# Patient Record
Sex: FEMALE | Race: WHITE | NOT HISPANIC OR LATINO | Employment: UNEMPLOYED | URBAN - METROPOLITAN AREA
[De-identification: names, ages, dates, MRNs, and addresses within clinical notes are randomized per-mention and may not be internally consistent; named-entity substitution may affect disease eponyms.]

---

## 2017-06-26 ENCOUNTER — OFFICE VISIT (OUTPATIENT)
Dept: FAMILY MEDICINE | Facility: CLINIC | Age: 37
End: 2017-06-26
Payer: COMMERCIAL

## 2017-06-26 ENCOUNTER — TELEPHONE (OUTPATIENT)
Dept: FAMILY MEDICINE | Facility: CLINIC | Age: 37
End: 2017-06-26

## 2017-06-26 VITALS
HEIGHT: 64 IN | BODY MASS INDEX: 23.9 KG/M2 | DIASTOLIC BLOOD PRESSURE: 70 MMHG | HEART RATE: 75 BPM | WEIGHT: 140 LBS | OXYGEN SATURATION: 100 % | SYSTOLIC BLOOD PRESSURE: 122 MMHG

## 2017-06-26 DIAGNOSIS — F41.9 ANXIETY: ICD-10-CM

## 2017-06-26 DIAGNOSIS — G47.419 PRIMARY NARCOLEPSY WITHOUT CATAPLEXY: Primary | ICD-10-CM

## 2017-06-26 PROBLEM — Z23 NEED FOR IMMUNIZATION AGAINST INFLUENZA: Status: ACTIVE | Noted: 2017-06-26

## 2017-06-26 PROBLEM — Z87.42 HISTORY OF FEMALE GENITAL SYSTEM DISORDER: Status: ACTIVE | Noted: 2017-06-26

## 2017-06-26 PROBLEM — Z86.2 HISTORY OF ANEMIA: Status: ACTIVE | Noted: 2017-06-26

## 2017-06-26 PROBLEM — Z78.9 NON-SMOKER: Status: ACTIVE | Noted: 2017-06-26

## 2017-06-26 PROBLEM — F52.0 HYPOACTIVE SEXUAL DESIRE: Status: ACTIVE | Noted: 2017-06-26

## 2017-06-26 PROBLEM — Z87.442 HISTORY OF RENAL CALCULI: Status: ACTIVE | Noted: 2017-06-26

## 2017-06-26 PROBLEM — R09.81 NASAL CONGESTION: Status: ACTIVE | Noted: 2017-06-26

## 2017-06-26 PROBLEM — S01.91XA LACERATION OF HEAD: Status: ACTIVE | Noted: 2017-06-26

## 2017-06-26 PROBLEM — R53.83 FATIGUE: Status: ACTIVE | Noted: 2017-06-26

## 2017-06-26 PROBLEM — R42 DIZZINESS OF UNKNOWN CAUSE: Status: ACTIVE | Noted: 2017-06-26

## 2017-06-26 LAB
ALBUMIN SERPL-MCNC: 4.8 G/DL (ref 3.1–4.7)
ALP SERPL-CCNC: 65 IU/L (ref 40–104)
ALT (SGPT): 35 IU/L (ref 3–33)
AST SERPL-CCNC: 29 IU/L (ref 10–40)
BASOPHILS NFR BLD: 0 K/UL (ref 0–0.2)
BASOPHILS NFR BLD: 0.5 %
BILIRUB SERPL-MCNC: 0.8 MG/DL (ref 0.3–1)
BUN SERPL-MCNC: 16 MG/DL (ref 8–20)
CALCIUM SERPL-MCNC: 9.2 MG/DL (ref 7.7–10.4)
CHLORIDE: 103 MMOL/L (ref 98–110)
CO2 SERPL-SCNC: 24.7 MMOL/L (ref 22.8–31.6)
CREATININE: 0.89 MG/DL (ref 0.6–1.4)
EOSINOPHIL NFR BLD: 0.1 K/UL (ref 0–0.7)
EOSINOPHIL NFR BLD: 1.4 %
ERYTHROCYTE [DISTWIDTH] IN BLOOD BY AUTOMATED COUNT: 12.6 % (ref 11.7–14.9)
GLUCOSE: 91 MG/DL (ref 70–99)
GRAN #: 5.2 K/UL (ref 1.4–6.5)
GRAN%: 67.6 %
HCT VFR BLD AUTO: 38.8 % (ref 36–48)
HGB BLD-MCNC: 12.8 G/DL (ref 12–15)
IMMATURE GRANS (ABS): 0 K/UL (ref 0–1)
IMMATURE GRANULOCYTES: 0.4 %
LYMPH #: 1.8 K/UL (ref 1.2–3.4)
LYMPH%: 23.6 %
MCH RBC QN AUTO: 29.6 PG (ref 25–35)
MCHC RBC AUTO-ENTMCNC: 33 G/DL (ref 31–36)
MCV RBC AUTO: 89.8 FL (ref 79–98)
MONO #: 0.5 K/UL (ref 0.1–0.6)
MONO%: 6.5 %
NUCLEATED RBCS: 0 %
PLATELET # BLD AUTO: 277 K/UL (ref 140–440)
PMV BLD AUTO: 10.5 FL (ref 8.8–12.7)
POTASSIUM SERPL-SCNC: 4.1 MMOL/L (ref 3.5–5)
PROT SERPL-MCNC: 8 G/DL (ref 6–8.2)
RBC # BLD AUTO: 4.32 M/UL (ref 3.5–5.5)
SODIUM: 137 MMOL/L (ref 134–144)
TSH SERPL DL<=0.005 MIU/L-ACNC: 2.36 ULU/ML (ref 0.3–5.6)
WBC # BLD AUTO: 7.7 K/UL (ref 5–10)

## 2017-06-26 PROCEDURE — 99214 OFFICE O/P EST MOD 30 MIN: CPT | Mod: ,,, | Performed by: FAMILY MEDICINE

## 2017-06-26 RX ORDER — ACETAMINOPHEN 500 MG
1 TABLET ORAL
COMMUNITY

## 2017-06-26 RX ORDER — VENLAFAXINE HYDROCHLORIDE 75 MG/1
75 CAPSULE, EXTENDED RELEASE ORAL DAILY
Qty: 30 CAPSULE | Refills: 5 | Status: SHIPPED | OUTPATIENT
Start: 2017-06-26 | End: 2018-03-20

## 2017-06-26 RX ORDER — ALPRAZOLAM 0.25 MG/1
0.25 TABLET ORAL
COMMUNITY
End: 2018-03-20 | Stop reason: SDUPTHER

## 2017-06-26 RX ORDER — VENLAFAXINE HYDROCHLORIDE 75 MG/1
1 CAPSULE, EXTENDED RELEASE ORAL DAILY
COMMUNITY
Start: 2017-03-23 | End: 2017-06-26 | Stop reason: SDUPTHER

## 2017-06-26 NOTE — PROGRESS NOTES
Subjective:       Patient ID:  Marianne Ramirez is a 37 y.o. female with multiple medical diagnoses as listed in the medical history and problem list that presents for Other (narcolepsy, eyes glazed over while eating dinner,  called Dr. Serrano, woke up the next AM & felt very groggy, vitals were normal) and Anxiety   Reports of similar episode since 6 years ago. More severe this Saturday, denies of urinary incontinence and bit tongue.       Anxiety   Presents for follow-up visit. Symptoms include dizziness, excessive worry and nervous/anxious behavior. Patient reports no chest pain, confusion, nausea, palpitations, shortness of breath or suicidal ideas. Symptoms occur most days. The severity of symptoms is moderate. The quality of sleep is fair. Nighttime awakenings: one to two.     Compliance with medications is %.   Dizziness:   Chronicity:  Recurrent  Onset:  In the past 7 days  Progression since onset:  Waxing and waning  Frequency:  Constantly  Pain Scale:  0/10  Severity:  Moderate  Duration:  2-5 hours  Dizziness characteristics:  Off-balance, trouble focusing eyes, spacial disorientation and lightheaded/impending faint   Associated symptoms: light-headedness and syncope.no hearing loss, no ear pain, no fever, no headaches, no nausea, no vomiting, no diaphoresis, no weakness, no palpitations, no facial weakness, no slurred speech and no chest pain.  Aggravated by:  Nothing  Treatments tried:  Nothing   PMH includes: anxiety.    Review of Systems   Constitutional: Negative for appetite change, chills, diaphoresis, fatigue and fever.   HENT: Negative for congestion, ear pain, hearing loss, sore throat and trouble swallowing.    Eyes: Negative for photophobia, pain and visual disturbance.   Respiratory: Negative for cough, chest tightness and shortness of breath.    Cardiovascular: Positive for syncope. Negative for chest pain, palpitations and leg swelling.   Gastrointestinal: Negative for  abdominal pain, blood in stool, constipation, diarrhea, nausea and vomiting.   Endocrine: Negative for cold intolerance and heat intolerance.   Genitourinary: Negative for difficulty urinating, flank pain, pelvic pain and vaginal pain.   Musculoskeletal: Negative for arthralgias and myalgias.   Skin: Negative for rash.   Allergic/Immunologic: Negative for immunocompromised state.   Neurological: Positive for dizziness and light-headedness. Negative for weakness and headaches.   Hematological: Negative for adenopathy. Does not bruise/bleed easily.   Psychiatric/Behavioral: Negative for confusion, self-injury and suicidal ideas. The patient is nervous/anxious.        Past Medical History:   Diagnosis Date    Anemia 2007    during pregnancy    Anxiety     Kidney stones     Ovarian cyst, right      Past Surgical History:   Procedure Laterality Date    OVARIAN CYST REMOVAL  2006    RIGHT OOPHORECTOMY  02/03/2012    [facility]     Family History   Problem Relation Age of Onset    Asthma Mother     Cancer Father      Non-hodgkins lymphoma    Cancer Maternal Grandmother      Breast CA    Heart disease Maternal Grandfather     Cancer Paternal Grandmother      Liver CA    Stroke Paternal Grandfather      Social History     Social History    Marital status:      Spouse name: N/A    Number of children: N/A    Years of education: N/A     Occupational History    Color guard director      [employer]     Social History Main Topics    Smoking status: Former Smoker     Types: Cigarettes     Quit date: 2/17/2004    Smokeless tobacco: Never Used      Comment: X 8 years ago    Alcohol use Yes      Comment: occasional    Drug use: No    Sexual activity: Not Asked     Other Topics Concern    None     Social History Narrative    None     Current Outpatient Prescriptions   Medication Sig Dispense Refill    acetaminophen (TYLENOL EXTRA STRENGTH) 500 MG tablet Take 1 tablet by mouth as needed.      alprazolam  (XANAX) 0.25 MG tablet Take 0.25 mg by mouth as needed for Anxiety.      venlafaxine (EFFEXOR-XR) 75 MG 24 hr capsule Take 1 capsule (75 mg total) by mouth Daily. 30 capsule 5     No current facility-administered medications for this visit.      Review of patient's allergies indicates:  No Known Allergies  Objective:      Vitals:    06/26/17 1108   BP: 122/70   Pulse: 75     Physical Exam   Constitutional: She is oriented to person, place, and time. She appears well-developed and well-nourished.   HENT:   Head: Normocephalic.   Eyes: Pupils are equal, round, and reactive to light. Right eye exhibits no discharge. Left eye exhibits no discharge. No scleral icterus.   Neck: No thyromegaly present.   Cardiovascular: Normal rate and regular rhythm.  Exam reveals no gallop and no friction rub.    No murmur heard.  Pulmonary/Chest: No respiratory distress. She has no wheezes. She has no rales. She exhibits no tenderness.   Abdominal: She exhibits no distension. There is no tenderness.   Musculoskeletal: She exhibits no tenderness.   Neurological: She is alert and oriented to person, place, and time. She displays normal reflexes. No cranial nerve deficit. She exhibits normal muscle tone. Coordination normal.   Skin: No erythema.   Psychiatric: She has a normal mood and affect.       Assessment:       1. Primary narcolepsy without cataplexy    2. Anxiety        Plan:       Primary narcolepsy without cataplexy  Comments:  since 2011, mild, worsening recently. PSG diagnostic study and MSLT/ MWT , refer to neurology to r/o seizure.  advise avoid benzo.  Orders:  -     Ambulatory referral to Neurology  -     CBC auto differential; Future; Expected date: 06/26/2017  -     Comprehensive metabolic panel; Future; Expected date: 06/26/2017  -     TSH; Future; Expected date: 06/26/2017    Anxiety  Comments:  on effexor and benzo prn.   Orders:  -     venlafaxine (EFFEXOR-XR) 75 MG 24 hr capsule; Take 1 capsule (75 mg total) by mouth  Daily.  Dispense: 30 capsule; Refill: 5      Advise patient RTC or ER if SOB, chest pain, vision changes, N/V or worsening of symptoms . Advise pt no- driving until assessment from Neurology. Pt voices understanding.     Return in about 2 weeks (around 7/10/2017) for anxiety.      6/26/2017 Lucero Felix M.D.

## 2017-06-26 NOTE — TELEPHONE ENCOUNTER
----- Message from Lucero Felix MD sent at 6/26/2017  3:17 PM CDT -----  Please notify pt : the result is acceptable.There are no significant abnormalities that are identified.  Please dont hesitate contact us if having any questions.  Or It will be reviewed in detail in the next visit.    Thanks . Lucero FELIX MD

## 2017-06-26 NOTE — PROGRESS NOTES
Please notify pt : the result is acceptable.There are no significant abnormalities that are identified.  Please dont hesitate contact us if having any questions.  Or It will be reviewed in detail in the next visit.    Thanks . Lucero CHAMPION MD

## 2017-07-12 ENCOUNTER — TELEPHONE (OUTPATIENT)
Dept: FAMILY MEDICINE | Facility: CLINIC | Age: 37
End: 2017-07-12

## 2017-07-12 NOTE — TELEPHONE ENCOUNTER
----- Message from Lucero Felix MD sent at 7/11/2017  2:25 PM CDT -----  Please let pt know : no CANDIE. thanks  ----- Message -----  From: Crista Moran LPN  Sent: 7/11/2017   8:02 AM  To: Lucero Felix MD

## 2018-03-20 ENCOUNTER — OFFICE VISIT (OUTPATIENT)
Dept: FAMILY MEDICINE | Facility: CLINIC | Age: 38
End: 2018-03-20
Payer: COMMERCIAL

## 2018-03-20 VITALS
DIASTOLIC BLOOD PRESSURE: 70 MMHG | HEART RATE: 74 BPM | BODY MASS INDEX: 25.21 KG/M2 | HEIGHT: 64 IN | RESPIRATION RATE: 16 BRPM | SYSTOLIC BLOOD PRESSURE: 118 MMHG | OXYGEN SATURATION: 97 % | WEIGHT: 147.69 LBS

## 2018-03-20 DIAGNOSIS — F41.1 GAD (GENERALIZED ANXIETY DISORDER): ICD-10-CM

## 2018-03-20 DIAGNOSIS — R51.9 NEW ONSET OF HEADACHES: Primary | ICD-10-CM

## 2018-03-20 DIAGNOSIS — R53.83 FATIGUE, UNSPECIFIED TYPE: ICD-10-CM

## 2018-03-20 DIAGNOSIS — Z00.00 PHYSICAL EXAM: ICD-10-CM

## 2018-03-20 PROCEDURE — 99214 OFFICE O/P EST MOD 30 MIN: CPT | Mod: ,,, | Performed by: INTERNAL MEDICINE

## 2018-03-20 RX ORDER — BUPROPION HYDROCHLORIDE 150 MG/1
150 TABLET ORAL DAILY
Qty: 30 TABLET | Refills: 11 | Status: SHIPPED | OUTPATIENT
Start: 2018-03-20 | End: 2019-03-20

## 2018-03-20 RX ORDER — ALPRAZOLAM 0.25 MG/1
0.25 TABLET ORAL DAILY PRN
Qty: 30 TABLET | Refills: 5 | Status: SHIPPED | OUTPATIENT
Start: 2018-03-20

## 2018-03-20 RX ORDER — MELOXICAM 7.5 MG/1
7.5 TABLET ORAL DAILY
Qty: 30 TABLET | Refills: 2 | Status: SHIPPED | OUTPATIENT
Start: 2018-03-20 | End: 2018-06-21 | Stop reason: SDUPTHER

## 2018-03-20 RX ORDER — VENLAFAXINE HYDROCHLORIDE 150 MG/1
150 CAPSULE, EXTENDED RELEASE ORAL DAILY
Qty: 30 CAPSULE | Refills: 11 | Status: SHIPPED | OUTPATIENT
Start: 2018-03-20 | End: 2019-03-20

## 2018-03-20 RX ORDER — METHOCARBAMOL 750 MG/1
750 TABLET, FILM COATED ORAL 2 TIMES DAILY PRN
Qty: 60 TABLET | Refills: 1 | Status: SHIPPED | OUTPATIENT
Start: 2018-03-20 | End: 2018-04-19

## 2018-03-20 RX ORDER — BUTALBITAL, ACETAMINOPHEN AND CAFFEINE 50; 325; 40 MG/1; MG/1; MG/1
1 TABLET ORAL EVERY 8 HOURS PRN
Qty: 60 TABLET | Refills: 4 | Status: SHIPPED | OUTPATIENT
Start: 2018-03-20 | End: 2018-04-19

## 2018-03-21 NOTE — PATIENT INSTRUCTIONS
General Neck and Back Pain    Both neck and back pain are usually caused by injury to the muscles or ligaments of the spine. Sometimes the disks that separate each bone of the spine may cause pain by pressing on a nearby nerve. Back and neck pain may appear after a sudden twisting or bending force (such as in a car accident), or sometimes after a simple awkward movement. In either case, muscle spasm is often present and adds to the pain.  Acute neck and back pain usually gets better in 1 to 2 weeks. Pain related to disk disease, arthritis in the spinal joints or spinal stenosis (narrowing of the spinal canal) can become chronic and last for months or years.  Back and neck pain are common problems. Most people feel better in 1 or 2 weeks, and most of the rest in 1 to 2 months. Most people can remain active.  People experience and describe pain differently.  · Pain can be sharp, stabbing, shooting, aching, cramping, or burning  · Movement, standing, bending, lifting, sitting, or walking may worsen the pain  · Pain can be localized to one spot or area, or it can be more generalized  · Pain can spread or radiate upwards, downwards, to the front, or go down your arms  · Muscle spasm may occur.  Most of the time mechanical problems with the muscles or spine cause the pain. it is usually caused by an injury, whether known or not, to the muscles or ligaments. While illnesses can cause back pain, it is usually not caused by a serious illness. Pain is usually related to physical activity, whether sports, exercise, work, or normal activity. Sometimes it can occur without an identifiable cause. This can happen simply by stretching or moving wrong, without noting pain at the time. Other causes include:  · Overexertion, lifting, pushing, pulling incorrectly or too aggressively.  · Sudden twisting, bending or stretching from an accident (car or fall), or accidental movement.  · Poor posture  · Poor conditioning, lack of regular  exercise  · Spinal disc disease or arthritis  · Stress  · Pregnancy, or illness like appendicitis, bladder or kidney infection, pelvic infections   Home care  · For neck pain: Use a comfortable pillow that supports the head and keeps the spine in a neutral position. The position of the head should not be tilted forward or backward.  · When in bed, try to find a position of comfort. A firm mattress is best. Try lying flat on your back with pillows under your knees. You can also try lying on your side with your knees bent up towards your chest and a pillow between your knees.  · At first, do not try to stretch out the sore spots. If there is a strain, it is not like the good soreness you get after exercising without an injury. In this case, stretching may make it worse.  · Avoid prolonged sitting, long car rides or travel. This puts more stress on the lower back than standing or walking.  · During the first 24 to 72 hours after an injury, apply an ice pack to the painful area for 20 minutes and then remove it for 20 minutes over a period of 60 to 90 minutes or several times a day.   · You can alternate ice and heat therapies. Talk with your healthcare provider about the best treatment for your back or neck pain. As a safety precaution, do not use a heating pad at bedtime. Sleeping with a heating pad can lead to skin burns or tissue damage.  · Therapeutic massage can help relax the back and neck muscles without stretching them.  · Be aware of safe lifting methods and do not lift anything over 15 pounds until all the pain is gone.  Medications  Talk to your healthcare provider before using medicine, especially if you have other medical problems or are taking other medicines.  · You may use over-the-counter medicine to control pain, unless another pain medicine was prescribed. If you have chronic conditions like diabetes, liver or kidney disease, stomach ulcers,  gastrointestinal bleeding, or are taking blood thinner  medicines.  · Be careful if you are given pain medicines, narcotics, or medicine for muscle spasm. They can cause drowsiness, and can affect your coordination, reflexes, and judgment. Do not drive or operate heavy machinery.  Follow-up care  Follow up with your healthcare provider, or as advised. Physical therapy or further tests may be needed.  If X-rays were taken, you will be notified of any new findings that may affect your care.  Call 911  Seek emergency medical care if any of the following occur:  · Trouble breathing  · Confusion  · Very drowsy or trouble awakening  · Fainting or loss of consciousness  · Rapid or very slow heart rate  · Loss of bowel or bladder control  When to seek medical advice  Call your healthcare provider right away if any of these occur:  · Pain becomes worse or spreads into your arms or legs  · Weakness, numbness or pain in one or both arms or legs  · Numbness in the groin area  · Difficulty walking  · Fever of 100.4ºF (38ºC) or higher, or as directed by your healthcare provider  Date Last Reviewed: 7/1/2016 © 2000-2017 [employer]. [address] [place] [address], [place] [address]. All rights reserved. This information is not intended as a substitute for professional medical care. Always follow your healthcare professional's instructions.

## 2018-03-21 NOTE — PROGRESS NOTES
"Subjective:       Patient ID: Marianne Ramirez is a 37 y.o. female.    Chief Complaint: Follow-up (general)    37 yr old female her for her new onset of tension headaches that occur every day and get worse as the day goes on . They have been going on for the last couple of months and getting worse in intensity and frequently. they do not wake her form sleep and she does admit to sleeping fairly well. Her  is accompanied her and he gets both migraines and tension headaches. She newer really suffered with these before. Her periods are still fairly regularly and they are not pulsating in nature. They start out tin the back of her neck and can radiate into her should blades bilaterally with a muscle like tension. 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 She does have a history of AYALA which       Review of Systems   Constitutional: Negative for activity change, appetite change, chills, diaphoresis, fatigue, fever and unexpected weight change.   HENT: Negative for congestion, ear pain, mouth sores, postnasal drip, sinus pressure, sore throat and trouble swallowing.    Eyes: Negative for pain, redness and visual disturbance.   Respiratory: Negative for apnea, cough, chest tightness, shortness of breath and wheezing.    Cardiovascular: Negative for chest pain, palpitations and leg swelling.   Gastrointestinal: Negative for abdominal distention, abdominal pain, blood in stool, constipation, diarrhea, nausea and vomiting.   Endocrine: Negative for cold intolerance, polydipsia, polyphagia and polyuria.   Genitourinary: Negative for difficulty urinating, dysuria, flank pain, frequency, hematuria, menstrual problem, pelvic pain and urgency.   Musculoskeletal: Positive for neck pain. Negative for arthralgias, back pain and joint swelling.   Skin: Negative for color change, rash and wound.   Neurological: Negative for dizziness, tremors, seizures, syncope, weakness, light-headedness, numbness and " headaches.   Hematological: Negative for adenopathy. Does not bruise/bleed easily.   Psychiatric/Behavioral: Positive for decreased concentration and dysphoric mood. Negative for confusion, hallucinations, self-injury, sleep disturbance and suicidal ideas. The patient is nervous/anxious.        Past Medical History:   Diagnosis Date    Anemia 2007    during pregnancy    Anxiety     Kidney stones     Ovarian cyst, right        Past Surgical History:   Procedure Laterality Date    OVARIAN CYST REMOVAL  2006    RIGHT [facility]03/2012    [facility]       Family History   Problem Relation Age of Onset    Asthma Mother     Cancer Father      Non-hodgkins lymphoma    Cancer Maternal Grandmother      Breast CA    Heart disease Maternal Grandfather     Cancer Paternal Grandmother      Liver CA    Stroke Paternal Grandfather        Social History     Social History    Marital status:      Spouse name: N/A    Number of children: N/A    Years of education: N/A     Occupational History    Color guard director      [employer]     Social History Main Topics    Smoking status: Former Smoker     Types: Cigarettes     Quit date: 2/17/2004    Smokeless tobacco: Never Used      Comment: X 8 years ago    Alcohol use Yes      Comment: occasional    Drug use: No    Sexual activity: Not Asked     Other Topics Concern    None     Social History Narrative    None       Current Outpatient Prescriptions   Medication Sig Dispense Refill    acetaminophen (TYLENOL EXTRA STRENGTH) 500 MG tablet Take 1 tablet by mouth as needed.      ALPRAZolam (XANAX) 0.25 MG tablet Take 1 tablet (0.25 mg total) by mouth daily as needed for Anxiety. 30 tablet 5    buPROPion (WELLBUTRIN XL) 150 MG TB24 tablet Take 1 tablet (150 mg total) by mouth once daily. 30 tablet 11    butalbital-acetaminophen-caffeine -40 mg (FIORICET, ESGIC) -40 mg per tablet Take 1 tablet by mouth every 8 (eight) hours as needed for Pain. 60  tablet 4    meloxicam (MOBIC) 7.5 MG tablet Take 1 tablet (7.5 mg total) by mouth once daily. With food 30 tablet 2    methocarbamol (ROBAXIN) 750 MG Tab Take 1 tablet (750 mg total) by mouth 2 (two) times daily as needed. 60 tablet 1    venlafaxine (EFFEXOR-XR) 150 MG Cp24 Take 1 capsule (150 mg total) by mouth once daily. 30 capsule 11     No current facility-administered medications for this visit.        Review of patient's allergies indicates:  No Known Allergies  Objective:      Physical Exam   Constitutional: She is oriented to person, place, and time. She appears well-developed and well-nourished.   HENT:   Head: Normocephalic and atraumatic.   Eyes: Right eye exhibits no discharge. Left eye exhibits no discharge. No scleral icterus.   Neck: Neck supple. No JVD present.   Cardiovascular: Normal rate and regular rhythm.  Exam reveals no gallop and no friction rub.    No murmur heard.  Pulmonary/Chest: Effort normal and breath sounds normal. She has no wheezes. She has no rales.   Abdominal: Soft. Bowel sounds are normal. She exhibits no distension. There is no tenderness.   Musculoskeletal: She exhibits no edema.        Cervical back: She exhibits decreased range of motion, tenderness, swelling, pain and spasm.   Lymphadenopathy:     She has no cervical adenopathy.   Neurological: She is alert and oriented to person, place, and time. She has normal reflexes. No cranial nerve deficit.   Skin: No rash noted. No erythema.   Psychiatric: She has a normal mood and affect.   Nursing note and vitals reviewed.      Assessment:       1. New onset of headaches    2. AYALA (generalized anxiety disorder)    3. Fatigue, unspecified type    4. Physical exam        Plan:       New onset of headaches  -     X-Ray Cervical Spine Complete 5 view  -     meloxicam (MOBIC) 7.5 MG tablet; Take 1 tablet (7.5 mg total) by mouth once daily. With food  Dispense: 30 tablet; Refill: 2  -     methocarbamol (ROBAXIN) 750 MG Tab; Take 1  tablet (750 mg total) by mouth 2 (two) times daily as needed.  Dispense: 60 tablet; Refill: 1  -     butalbital-acetaminophen-caffeine -40 mg (FIORICET, ESGIC) -40 mg per tablet; Take 1 tablet by mouth every 8 (eight) hours as needed for Pain.  Dispense: 60 tablet; Refill: 4    AYALA (generalized anxiety disorder)  -     ALPRAZolam (XANAX) 0.25 MG tablet; Take 1 tablet (0.25 mg total) by mouth daily as needed for Anxiety.  Dispense: 30 tablet; Refill: 5  -     venlafaxine (EFFEXOR-XR) 150 MG Cp24; Take 1 capsule (150 mg total) by mouth once daily.  Dispense: 30 capsule; Refill: 11  -     buPROPion (WELLBUTRIN XL) 150 MG TB24 tablet; Take 1 tablet (150 mg total) by mouth once daily.  Dispense: 30 tablet; Refill: 11    Fatigue, unspecified type  -     CBC auto differential; Future; Expected date: 03/20/2018  -     TSH; Future; Expected date: 03/20/2018    Physical exam  -     Comprehensive metabolic panel; Future; Expected date: 03/20/2018  -     Lipid panel; Future; Expected date: 03/20/2018

## 2018-04-30 LAB
ALBUMIN SERPL-MCNC: 3.8 G/DL (ref 3.1–4.7)
ALP SERPL-CCNC: 59 IU/L (ref 40–104)
ALT (SGPT): 15 IU/L (ref 3–33)
AST SERPL-CCNC: 19 IU/L (ref 10–40)
BASOPHILS NFR BLD: 0 K/UL (ref 0–0.2)
BASOPHILS NFR BLD: 0.3 %
BILIRUB SERPL-MCNC: 0.3 MG/DL (ref 0.3–1)
BUN SERPL-MCNC: 15 MG/DL (ref 8–20)
CALCIUM SERPL-MCNC: 9.2 MG/DL (ref 7.7–10.4)
CHLORIDE: 100 MMOL/L (ref 98–110)
CO2 SERPL-SCNC: 24.8 MMOL/L (ref 22.8–31.6)
CREATININE: 0.79 MG/DL (ref 0.6–1.4)
EOSINOPHIL NFR BLD: 0.1 K/UL (ref 0–0.7)
EOSINOPHIL NFR BLD: 2 %
ERYTHROCYTE [DISTWIDTH] IN BLOOD BY AUTOMATED COUNT: 12.9 % (ref 11.7–14.9)
GLUCOSE: 92 MG/DL (ref 70–99)
GRAN #: 4.2 K/UL (ref 1.4–6.5)
GRAN%: 62.6 %
HCT VFR BLD AUTO: 36.8 % (ref 36–48)
HGB BLD-MCNC: 12.1 G/DL (ref 12–15)
IMMATURE GRANS (ABS): 0 K/UL (ref 0–1)
IMMATURE GRANULOCYTES: 0.6 %
LYMPH #: 1.7 K/UL (ref 1.2–3.4)
LYMPH%: 26.1 %
MCH RBC QN AUTO: 30 PG (ref 25–35)
MCHC RBC AUTO-ENTMCNC: 32.9 G/DL (ref 31–36)
MCV RBC AUTO: 91.1 FL (ref 79–98)
MONO #: 0.6 K/UL (ref 0.1–0.6)
MONO%: 8.4 %
NUCLEATED RBCS: 0 %
PLATELET # BLD AUTO: 239 K/UL (ref 140–440)
PMV BLD AUTO: 10.2 FL (ref 8.8–12.7)
POTASSIUM SERPL-SCNC: 3.9 MMOL/L (ref 3.5–5)
PROT SERPL-MCNC: 6.9 G/DL (ref 6–8.2)
RBC # BLD AUTO: 4.04 M/UL (ref 3.5–5.5)
SODIUM: 135 MMOL/L (ref 134–144)
TSH SERPL DL<=0.005 MIU/L-ACNC: 2.32 ULU/ML (ref 0.3–5.6)
WBC # BLD AUTO: 6.6 K/UL (ref 5–10)

## 2018-05-01 ENCOUNTER — OFFICE VISIT (OUTPATIENT)
Dept: FAMILY MEDICINE | Facility: CLINIC | Age: 38
End: 2018-05-01
Payer: COMMERCIAL

## 2018-05-01 VITALS
WEIGHT: 148.31 LBS | HEIGHT: 64 IN | SYSTOLIC BLOOD PRESSURE: 108 MMHG | OXYGEN SATURATION: 98 % | RESPIRATION RATE: 16 BRPM | BODY MASS INDEX: 25.32 KG/M2 | HEART RATE: 86 BPM | DIASTOLIC BLOOD PRESSURE: 80 MMHG

## 2018-05-01 DIAGNOSIS — S16.1XXD STRAIN OF NECK MUSCLE, SUBSEQUENT ENCOUNTER: Primary | ICD-10-CM

## 2018-05-01 DIAGNOSIS — R51.9 NEW ONSET OF HEADACHES: ICD-10-CM

## 2018-05-01 DIAGNOSIS — F41.1 GAD (GENERALIZED ANXIETY DISORDER): ICD-10-CM

## 2018-05-01 DIAGNOSIS — E78.5 DYSLIPIDEMIA, GOAL LDL BELOW 130: ICD-10-CM

## 2018-05-01 PROBLEM — N83.201 OVARIAN CYST, RIGHT: Status: ACTIVE | Noted: 2018-05-01

## 2018-05-01 PROBLEM — S16.1XXA CERVICAL STRAIN: Status: ACTIVE | Noted: 2018-05-01

## 2018-05-01 PROCEDURE — 99214 OFFICE O/P EST MOD 30 MIN: CPT | Mod: ,,, | Performed by: INTERNAL MEDICINE

## 2018-05-01 NOTE — PROGRESS NOTES
Subjective:       Patient ID: Marianne Ramirez is a 37 y.o. female.    Chief Complaint: Follow-up (6 WK F/U - NECK PAIN)    37-year-old female here for follow-up after 6 weeks of conservative therapy for cervical neck strain and confirmation and tension headaches which are new onset for her. She has been on meloxicam for 6 weeks and using her skeletal muscle relaxer as needed with 50% improvement of neck tension and discomfort in the headaches have cut back quite significantly to approximately 80% improvement. She has used a few Fioricet as well.  On last visit we also increased her Effexor to 150 mg from 75 and she notices a significant improvement in anxiety and dysthymia. And the Wellbutrin is being well tolerated. She has only had to use Xanax a few times as well.  She also had routine lab work and everything was excellent except her cholesterol which showed her total cholesterol to be approximately 245 and her LDL to be approximately 165. Her total cholesterol to good cholesterol ratio as below was 5.0.      Review of Systems   Constitutional: Negative for activity change, appetite change, chills, diaphoresis, fatigue, fever and unexpected weight change.   HENT: Negative for congestion, ear pain, mouth sores, postnasal drip, sinus pressure, sore throat and trouble swallowing.    Eyes: Negative for pain, redness and visual disturbance.   Respiratory: Negative for apnea, cough, chest tightness, shortness of breath and wheezing.    Cardiovascular: Negative for chest pain, palpitations and leg swelling.   Gastrointestinal: Negative for abdominal distention, abdominal pain, blood in stool, constipation, diarrhea, nausea and vomiting.   Endocrine: Negative for cold intolerance, polydipsia, polyphagia and polyuria.   Genitourinary: Negative for difficulty urinating, dysuria, flank pain, frequency, hematuria, menstrual problem, pelvic pain and urgency.   Musculoskeletal: Positive for neck pain. Negative for  arthralgias, back pain and joint swelling.   Skin: Negative for color change, rash and wound.   Neurological: Positive for headaches. Negative for dizziness, tremors, seizures, syncope, weakness, light-headedness and numbness.   Hematological: Negative for adenopathy. Does not bruise/bleed easily.   Psychiatric/Behavioral: Negative for confusion, decreased concentration, dysphoric mood, hallucinations, self-injury, sleep disturbance and suicidal ideas. The patient is not nervous/anxious.        Past Medical History:   Diagnosis Date    Anemia 2007    during pregnancy    Anxiety     Kidney stones     Ovarian cyst, right        Past Surgical History:   Procedure Laterality Date    OVARIAN CYST REMOVAL  2006    RIGHT O[facility]ECT[facility]  02/03/2012    [facility]       Family History   Problem Relation Age of Onset    Asthma Mother     Cancer Father      Non-hodgkins lymphoma    Cancer Maternal Grandmother      Breast CA    Heart disease Maternal Grandfather     Cancer Paternal Grandmother      Liver CA    Stroke Paternal Grandfather        Social History     Social History    Marital status:      Spouse name: N/A    Number of children: N/A    Years of education: N/A     Occupational History    Color guard director      [employer]     Social History Main Topics    Smoking status: Former Smoker     Types: Cigarettes     Quit date: 2/17/2004    Smokeless tobacco: Never Used      Comment: X 8 years ago    Alcohol use Yes      Comment: occasional    Drug use: No    Sexual activity: Not Asked     Other Topics Concern    None     Social History Narrative    None       Current Outpatient Prescriptions   Medication Sig Dispense Refill    acetaminophen (TYLENOL EXTRA STRENGTH) 500 MG tablet Take 1 tablet by mouth as needed.      ALPRAZolam (XANAX) 0.25 MG tablet Take 1 tablet (0.25 mg total) by mouth daily as needed for Anxiety. 30 tablet 5    buPROPion (WELLBUTRIN XL) 150 MG TB24 tablet Take 1 tablet (150  mg total) by mouth once daily. 30 tablet 11    meloxicam (MOBIC) 7.5 MG tablet Take 1 tablet (7.5 mg total) by mouth once daily. With food 30 tablet 2    venlafaxine (EFFEXOR-XR) 150 MG Cp24 Take 1 capsule (150 mg total) by mouth once daily. 30 capsule 11     No current facility-administered medications for this visit.        Review of patient's allergies indicates:  No Known Allergies  Objective:      Physical Exam   Constitutional: She is oriented to person, place, and time. She appears well-developed and well-nourished.   HENT:   Head: Normocephalic and atraumatic.   Eyes: Right eye exhibits no discharge. Left eye exhibits no discharge. No scleral icterus.   Neck: Normal range of motion. Neck supple. No JVD present. Muscular tenderness present.   Cardiovascular: Normal rate and regular rhythm.  Exam reveals no gallop and no friction rub.    No murmur heard.  Pulmonary/Chest: Effort normal and breath sounds normal. She has no wheezes. She has no rales.   Abdominal: Soft. Bowel sounds are normal. She exhibits no distension. There is no tenderness.   Musculoskeletal: She exhibits no edema or tenderness.   Lymphadenopathy:     She has no cervical adenopathy.   Neurological: She is alert and oriented to person, place, and time. She has normal reflexes. No cranial nerve deficit.   Skin: No rash noted. No erythema.   Psychiatric: She has a normal mood and affect.   Nursing note and vitals reviewed.      Lab Results   Component Value Date    WBC 6.6 04/30/2018    HGB 12.1 04/30/2018    HCT 36.8 04/30/2018     04/30/2018    AST 19 04/30/2018     04/30/2018    K 3.9 04/30/2018     04/30/2018    CREATININE 0.79 04/30/2018    BUN 15 04/30/2018    CO2 24.8 04/30/2018    TSH 2.32 04/30/2018     Assessment:       1. Strain of neck muscle, subsequent encounter    2. Dyslipidemia, goal LDL below 130    3. AYALA (generalized anxiety disorder)    4. New onset of headaches        Plan:       Strain of neck muscle,  subsequent encounter secondary to New onset of headaches  -     Ambulatory Referral to Physical/Occupational Therapy    Dyslipidemia, goal LDL below 130-in view of the pt's age and lack of CRF, diet and exercise trial and recheck in 3 months  -     Lipid panel; Future; Expected date: 08/01/2018    AYALA (generalized anxiety disorder)-continue increased dose of effexor with the addition of welbutrin

## 2018-05-01 NOTE — PATIENT INSTRUCTIONS
Neck Sprain or Strain  A sudden force that causes turning or bending of the neck can cause sprain or strain. An example would be the force from a car accident. This can stretch or tear muscles called a strain. It can also stretch or tear ligaments called a sprain. Either of these can cause neck pain. Sometimes neck pain occurs after a simple awkward movement. In either case, muscle spasm is commonly present and contributes to the pain.     Unless you had a forceful physical injury (for example, a car accident or fall), X-rays are usually not ordered for the initial evaluation of neck pain. If pain continues and dose not respond to medical treatment, X-rays and other tests may be performed at a later time.  Home care  · You may feel more soreness and spasm the first few days after the injury. Rest until symptoms begin to improve.  · When lying down, use a comfortable pillow or a rolled towel that supports the head and keeps the spine in a neutral position. The position of the head should not be tilted forward or backward.  · Apply an ice pack over the injured area for 15 to 20 minutes every 3 to 6 hours. You should do this for the first 24 to 48 hours. You can make an ice pack by filling a plastic bag that seals at the top with ice cubes and then wrapping it with a thin towel. After 48 hours, apply heat (warm shower or warm bath) for 15 to 20 minutes several times a day, or alternate ice and heat.  · You may use over-the-counter pain medicine to control pain, unless another pain medicine was prescribed. If you have chronic liver or kidney disease or ever had a stomach ulcer or GI bleeding, talk with your healthcare provider before using these medicines.  · If a soft cervical collar was prescribed, it should be worn only for periods of increased pain. It should not be worn for more than 3 hours a day, or for a period longer than 1 to 2 weeks.  Follow-up care  Follow up with your healthcare provider as directed.  Physical therapy may be needed.  Sometimes fractures dont show up on the first X-ray. Bruises and sprains can sometimes hurt as much as a fracture. These injuries can take time to heal completely. If your symptoms dont improve or they get worse, talk with your healthcare provider. You may need a repeat X-ray or other tests. If X-rays were taken, you will be told of any new findings that may affect your care.  Call 911  Call 911 if you have:  · Neck swelling, difficulty or painful swallowing  · Difficulty breathing  · Chest pain  When to seek medical advice  Call your healthcare provider right away if any of these occur:  · Pain becomes worse or spreads into your arms  · Weakness or numbness in one or both arms  Date Last Reviewed: 11/19/2015  © 6288-6498 [employer]. [address] [place] [address], [place] [address]. All rights reserved. This information is not intended as a substitute for professional medical care. Always follow your healthcare professional's instructions.

## 2018-05-02 PROBLEM — E78.5 DYSLIPIDEMIA, GOAL LDL BELOW 130: Status: ACTIVE | Noted: 2018-05-02

## 2018-06-21 DIAGNOSIS — R51.9 NEW ONSET OF HEADACHES: ICD-10-CM

## 2018-06-21 RX ORDER — MELOXICAM 7.5 MG/1
7.5 TABLET ORAL DAILY
Qty: 30 TABLET | Refills: 2 | Status: SHIPPED | OUTPATIENT
Start: 2018-06-21

## 2025-07-16 ENCOUNTER — OFFICE VISIT (OUTPATIENT)
Dept: URGENT CARE | Facility: CLINIC | Age: 45
End: 2025-07-16
Payer: COMMERCIAL

## 2025-07-16 VITALS
TEMPERATURE: 98 F | WEIGHT: 160 LBS | RESPIRATION RATE: 20 BRPM | BODY MASS INDEX: 27.31 KG/M2 | HEIGHT: 64 IN | OXYGEN SATURATION: 99 % | DIASTOLIC BLOOD PRESSURE: 93 MMHG | HEART RATE: 80 BPM | SYSTOLIC BLOOD PRESSURE: 134 MMHG

## 2025-07-16 DIAGNOSIS — S39.012A STRAIN OF LUMBAR REGION, INITIAL ENCOUNTER: ICD-10-CM

## 2025-07-16 DIAGNOSIS — M54.50 ACUTE BILATERAL LOW BACK PAIN WITHOUT SCIATICA: Primary | ICD-10-CM

## 2025-07-16 PROCEDURE — 96372 THER/PROPH/DIAG INJ SC/IM: CPT | Mod: S$GLB,,, | Performed by: NURSE PRACTITIONER

## 2025-07-16 PROCEDURE — 99204 OFFICE O/P NEW MOD 45 MIN: CPT | Mod: 25,S$GLB,, | Performed by: NURSE PRACTITIONER

## 2025-07-16 RX ORDER — PREDNISONE 20 MG/1
20 TABLET ORAL 2 TIMES DAILY
Qty: 10 TABLET | Refills: 0 | Status: SHIPPED | OUTPATIENT
Start: 2025-07-16 | End: 2025-07-21

## 2025-07-16 RX ORDER — KETOROLAC TROMETHAMINE 30 MG/ML
30 INJECTION, SOLUTION INTRAMUSCULAR; INTRAVENOUS
Status: COMPLETED | OUTPATIENT
Start: 2025-07-16 | End: 2025-07-16

## 2025-07-16 RX ORDER — METHOCARBAMOL 500 MG/1
500 TABLET, FILM COATED ORAL 4 TIMES DAILY PRN
Qty: 20 TABLET | Refills: 0 | Status: SHIPPED | OUTPATIENT
Start: 2025-07-16 | End: 2025-07-21

## 2025-07-16 RX ADMIN — KETOROLAC TROMETHAMINE 30 MG: 30 INJECTION, SOLUTION INTRAMUSCULAR; INTRAVENOUS at 01:07

## 2025-07-16 NOTE — PATIENT INSTRUCTIONS
Take medications as prescribed.  Use of no muscle relaxers while working, driving, or operating heavy machinery.    You were given a Toradol injection in clinic today.  Please do not alternate with other NSAIDs such as ibuprofen or naproxen.  You may alternate Tylenol.  Recommend rotating ice and warm moist heat as directed.  Follow-up with PCP in 1-2 days.  Return to clinic as needed.  To ED for any new or acutely worsening symptoms.

## 2025-07-16 NOTE — PROGRESS NOTES
"Subjective:      Patient ID: Marianne Ramirez is a 45 y.o. female.    Vitals:  height is 5' 4" (1.626 m) and weight is 72.6 kg (160 lb). Her oral temperature is 98 °F (36.7 °C). Her blood pressure is 134/93 (abnormal) and her pulse is 80. Her respiration is 20 and oxygen saturation is 99%.     Chief Complaint: Back Pain (Back pain that has not responded to over the counter medications for back pain. Heating pad minimal relief. Back brace makes it easier to move - Entered by patient)    Patient is a 45-year-old female who presents today for 1 week of lower back pain.  Patient states she dances and is very active and usually has a flare-up of his pain about once a year.  For the last week she has been wearing her back brace, alternating ice and heat, and has taken multiple OTC medications with no relief.  She denies any sciatica, UTI symptoms, or bowel or bladder changes.    Back Pain  This is a new problem. The current episode started in the past 7 days. The problem occurs constantly. The problem has been gradually worsening since onset. The pain is present in the lumbar spine. The quality of the pain is described as stabbing, shooting and aching. The pain radiates to the right thigh. The pain is at a severity of 8/10. The pain is severe. The symptoms are aggravated by bending, position, coughing, lying down, standing, sitting and twisting. Pertinent negatives include no abdominal pain, chest pain, fever or headaches. She has tried bed rest, analgesics, heat, ice and NSAIDs for the symptoms. The treatment provided no relief.       Constitution: Negative. Negative for chills, sweating, fatigue and fever.   HENT: Negative.  Negative for ear pain, congestion and sore throat.    Neck: neck negative.   Cardiovascular: Negative.  Negative for chest pain and palpitations.   Eyes: Negative.    Respiratory: Negative.  Negative for chest tightness, cough and shortness of breath.    Gastrointestinal: Negative.  Negative for " abdominal pain, nausea, vomiting and diarrhea.   Endocrine: negative.   Genitourinary: Negative.    Musculoskeletal:  Positive for back pain. Negative for muscle ache.   Skin: Negative.  Negative for color change, pale and rash.   Allergic/Immunologic: Negative.    Neurological: Negative.  Negative for dizziness, light-headedness, passing out, headaches, disorientation and altered mental status.   Hematologic/Lymphatic: Negative.    Psychiatric/Behavioral: Negative.  Negative for altered mental status, disorientation and confusion.       Objective:     Physical Exam   Constitutional: She is oriented to person, place, and time. She appears well-developed. She is cooperative.  Non-toxic appearance. She does not appear ill. No distress.   HENT:   Head: Normocephalic and atraumatic.   Ears:   Right Ear: External ear and ear canal normal.   Left Ear: External ear and ear canal normal.   Nose: Nose normal. No rhinorrhea or congestion.   Mouth/Throat: Oropharynx is clear and moist and mucous membranes are normal. Mucous membranes are moist. Oropharynx is clear.   Eyes: Conjunctivae and lids are normal. Pupils are equal, round, and reactive to light. Extraocular movement intact   Neck: Trachea normal and phonation normal. Neck supple.   Cardiovascular: Normal rate, regular rhythm, normal heart sounds and normal pulses.   Pulmonary/Chest: Effort normal and breath sounds normal.   Abdominal: Normal appearance. Soft. flat abdomen There is no left CVA tenderness and no right CVA tenderness.   Musculoskeletal:         General: Tenderness present. No deformity.      Thoracic back: Normal.      Lumbar back: She exhibits tenderness.        Back:       Comments: Patient reports lumbar tenderness to palpation   Neurological: no focal deficit. She is alert, oriented to person, place, and time and at baseline. She has normal strength and normal reflexes. No sensory deficit.   Skin: Skin is warm, dry, intact and not diaphoretic.  Capillary refill takes less than 2 seconds.   Psychiatric: Her speech is normal and behavior is normal. Mood, judgment and thought content normal.   Nursing note and vitals reviewed.      Assessment:     1. Acute bilateral low back pain without sciatica    2. Strain of lumbar region, initial encounter        Plan:       Acute bilateral low back pain without sciatica    Strain of lumbar region, initial encounter    Other orders  -     ketorolac injection 30 mg  -     methocarbamoL (ROBAXIN) 500 MG Tab; Take 1 tablet (500 mg total) by mouth 4 (four) times daily as needed (muscle spasms).  Dispense: 20 tablet; Refill: 0  -     predniSONE (DELTASONE) 20 MG tablet; Take 1 tablet (20 mg total) by mouth 2 (two) times daily. for 5 days  Dispense: 10 tablet; Refill: 0        History reviewed.  Toradol injection given in clinic.  Patient tolerated well.  Medications as prescribed.  Discussed continue symptomatic treatment with patient.  Instructed patient to continue wearing back brace as needed for comfort.  Return precautions and red flags for ER discussed with patient.  Follow up with primary care.  Patient in agreement with plan of care.